# Patient Record
(demographics unavailable — no encounter records)

---

## 2024-12-10 NOTE — HISTORY OF PRESENT ILLNESS
[Worsening] : worsening [___ mths] : [unfilled] month(s) ago [8] : a current pain level of 8/10 [Prolonged Sitting] : worsened by prolonged sitting [Prolonged Standing] : worsened by prolonged standing [de-identified] : Patient is a 56-year-old female with noted lumbar stenosis with neurogenic claudication and lumbar herniated disc symptoms have increased and she has difficulty with prolonged sitting and standing. Numbness and tingling along the left lateral thigh. Radiating pain past the knee and into the lateral left calf. Patient only takes Tylenol as needed [Exercise Regimen] : not relieved by exercise regimen [Physical Therapy] : not relieved by physical therapy

## 2024-12-10 NOTE — PHYSICAL EXAM
[Normal] : Oriented to person, place, and time, insight and judgement were intact and the affect was normal [de-identified] : Negative FABERs of bilateral hips. Negative palpable tenderness of the greater trochanteric lateral hips. No noted atrophy noted of bilateral LE musculature. [de-identified] : Heel and toe walk is intact. Tension signs of bilateral LEs are negative. [LE] : Sensory: Intact in bilateral lower extremities [de-identified] : No new office x-rays today.

## 2024-12-10 NOTE — DISCUSSION/SUMMARY
[Medication Risks Reviewed] : Medication risks reviewed [Surgical risks reviewed] : Surgical risks reviewed [4 Weeks] : in 4 weeks [de-identified] : Patient was provided a referral prescription for an RG at L3-4. Patient is to return in approximately 4 weeks after the RG, discussed with patient that the epidural steroid injections if no relief is noted on the first injection she may need to follow-up for an additional 2 RG's. Patient and her  were provided a pain management specialist with address and telephone number.  I, Dr. Navid Duvall, personally performed the evaluation and management (E/M) services for this new patient.  That E/M includes conducting the initial examination, assessing all conditions, and establishing a plan of care.  Today, my ACP, Sona Hernandez, was here to observe my evaluation and management services for this patient to be followed going forward.

## 2024-12-10 NOTE — DISCUSSION/SUMMARY
[Medication Risks Reviewed] : Medication risks reviewed [Surgical risks reviewed] : Surgical risks reviewed [4 Weeks] : in 4 weeks [de-identified] : Patient was provided a referral prescription for an RG at L3-4. Patient is to return in approximately 4 weeks after the RG, discussed with patient that the epidural steroid injections if no relief is noted on the first injection she may need to follow-up for an additional 2 RG's. Patient and her  were provided a pain management specialist with address and telephone number.  I, Dr. Navid Duvall, personally performed the evaluation and management (E/M) services for this new patient.  That E/M includes conducting the initial examination, assessing all conditions, and establishing a plan of care.  Today, my ACP, Sona Hernandez, was here to observe my evaluation and management services for this patient to be followed going forward.

## 2024-12-10 NOTE — HISTORY OF PRESENT ILLNESS
[Worsening] : worsening [___ mths] : [unfilled] month(s) ago [8] : a current pain level of 8/10 [Prolonged Sitting] : worsened by prolonged sitting [Prolonged Standing] : worsened by prolonged standing [de-identified] : Patient is a 56-year-old female with noted lumbar stenosis with neurogenic claudication and lumbar herniated disc symptoms have increased and she has difficulty with prolonged sitting and standing. Numbness and tingling along the left lateral thigh. Radiating pain past the knee and into the lateral left calf. Patient only takes Tylenol as needed [Exercise Regimen] : not relieved by exercise regimen [Physical Therapy] : not relieved by physical therapy

## 2024-12-10 NOTE — PHYSICAL EXAM
[Normal] : Oriented to person, place, and time, insight and judgement were intact and the affect was normal [de-identified] : Negative FABERs of bilateral hips. Negative palpable tenderness of the greater trochanteric lateral hips. No noted atrophy noted of bilateral LE musculature. [de-identified] : Heel and toe walk is intact. Tension signs of bilateral LEs are negative. [LE] : Sensory: Intact in bilateral lower extremities [de-identified] : No new office x-rays today.